# Patient Record
Sex: FEMALE | Race: WHITE | Employment: UNEMPLOYED | URBAN - METROPOLITAN AREA
[De-identification: names, ages, dates, MRNs, and addresses within clinical notes are randomized per-mention and may not be internally consistent; named-entity substitution may affect disease eponyms.]

---

## 2022-11-15 ENCOUNTER — HOSPITAL ENCOUNTER (OUTPATIENT)
Dept: GENERAL RADIOLOGY | Age: 27
Discharge: HOME | End: 2022-11-15
Payer: MEDICAID

## 2022-11-15 ENCOUNTER — HOSPITAL ENCOUNTER (OUTPATIENT)
Dept: SPEECH THERAPY | Age: 27
Setting detail: THERAPIES SERIES
Discharge: HOME | End: 2022-11-15
Payer: MEDICAID

## 2022-11-15 DIAGNOSIS — R13.10 PROBLEMS WITH SWALLOWING AND MASTICATION: ICD-10-CM

## 2022-11-15 PROCEDURE — 92611 MOTION FLUOROSCOPY/SWALLOW: CPT

## 2022-11-15 PROCEDURE — 74230 X-RAY XM SWLNG FUNCJ C+: CPT

## 2022-11-15 NOTE — PROCEDURES
Speech Language Pathology  Modified Barium Swallow Study  Facility/Department: [facility] SPEECH THERAPY        Recommendations:  Diet recommendation: IDDSI 6 Soft and Bite Sized Solids; IDDSI 2 Mildly Thick (nectar) Liquids; Meds PO as tolerated  Risk management: upright for all intake, stay upright for at least 30 mins after intake, small bites/sips, assist feed, 1:1 supervision with intake, oral care 2-3x/day to reduce adverse affects in the event of aspiration, alternate bites/sips, slow rate of intake, general GERD precautions, general aspiration precautions, and hold PO and contact SLP if s/s of aspiration or worsening respiratory status develop. NAME:Mckenzie Ibrahim  : 1995 (23 y.o.)   MRN: [id number]  ROOM: Room/bed info not found  ADMISSION DATE: (Not on file)  PATIENT DIAGNOSIS(ES): No admission diagnoses are documented for this encounter. No chief complaint on file. There are no problems to display for this patient. No past medical history on file. No past surgical history on file. Not on File    Current Diet Solid Consistency: Chopped diet  Current Diet Liquid Consistency: Thin liquids    Date of Prior Study: n/a  Type of Study: n/a  Results of Prior Study: n/a    Recent CXR/CT of Chest: n/a    Patient Complaints/Reason for Referral:  Tavo Chappell was referred for a MBS to assess the efficiency of his/her swallow function, assess for aspiration, and to make recommendations regarding safe dietary consistencies, effective compensatory strategies, and safe eating environment. Pain   Patient Currently in Pain: No    General Comments: Pt arrived for study with 2 caregivers. Pt nonverbal.  Per caregivers, pt is on a \"chopped diet' and thin liquids at baseline. They reported that pt has had intermittent coughing with meals, increased difficulty with mastication of solid PO, and has required more cues/assistance with meals.        Medical record review/interview:   Predisposing dysphagia risk factors: Hx of neurological disease  and Cognitive Deficit  Clinical signs of possible chronic dysphagia: N/A  Precipitating dysphagia risk factors: N/A      Impressions:  Treatment Dx and ICD 10: Dysphagia, unspecified (R13.10)  Radiologist: Dr. Roni Valle  Referring MD: Dr. Cary Sánchez:     Oral Preparation / Oral Phase  Impaired  Oral Phase - Major Contributing Deficits  Poor Mastication: Regular   Weak Lingual Manipulation: All  Lingual Pumping: Regular   Reduced Posterior Propulsion: Puree, Regular   Reduced Bolus Control: All  Decreased Bolus Cohesion: All  Lingual / Palatal Residue: Puree, Regular ; minimal  Oral phase comment: SLP provided intermittent cues for bolus manipulation with solid PO trials. Increased mastication time required with occasional oral holding noted with regular solid trials (despite cues). Pharyngeal Phase  Impaired  Pharyngeal Phase - Major Contributing Deficits  Delayed Swallow Initiation: All  Premature Spillage to Valleculae: All  Premature Spillage to Pyriform: Thin straw   Reduced Epiglottic Retroversion: All  Reduced Laryngeal Elevation:  All  Reduced Anterior Laryngeal Movement: All  Reduced Airway/laryngeal Closure: All  Reduced Tongue Base Retraction:  All; mildly reduced  Shallow Penetration During: Mildly (nectar) thick straw   Complete Self-clearing (shallow): Mildly (nectar) thick straw   Deep Penetration During: Thin straw   Partial Retrieval (deep): Thin straw   No Retrieval (deep): Thin straw   Aspiration After: Thin straw   No Cough Reflex: Thin straw   No Bolus Expelled: Thin straw   Pharyngeal phase comment: Reduced hyolaryngeal excursion, reduced epiglottic retroversion, and delayed swallow initiation, results in reduced airway/laryngeal closure with deep penetration during the swallow with thin liquid trials via straw and water bottle (brought by pt's caregivers). Eventual silent aspiration noted after the swallow with thin liquids.   Pt unable to successfully follow commands to complete cued cough in an effort to clear aspirated material.  *Of note, pt also quite impulsive and takes large, consecutive sips despite cues. SLP attempted to provide small amounts of PO at a time. Instances of shallow penetration observed during the swallow with mildly (nectar) thick via straw that were completely self-clearing. No aspiration. No penetration/aspiration noted with solid PO trials. No significant pharyngeal residue noted post-swallow with any PO trials. Unable to trial postural changes/swallow maneuvers (I.e. chin tuck) during study d/t pt's inability to follow commands despite cues. Although pt does not have history of respiratory illnesses and is currently on RA, recommend downgrade to mildly (nectar) thick liquids at this time. Recommend providing low volume amount of liquid at a time (d/t impulsivity) and supervision with meals to ensure adherence to compensatory swallow strategies (I.e. small bites/sips, upright positioning with PO intake). Esophageal Phase  Appears WFL when viewed at the cervical level throughout the duration of the study      Aspiration Scale   1 Material does not enter the airway    2 Material enters the airway, remains above the vocal folds, and is ejected from the airway    3 Material enters the airway, remains above the vocal folds, and is not ejected from the airway    4 Material enters the airway, contacts the vocal folds, an is ejected from the airway    5 Material enters the airway, contacts the vocal folds, and is not ejected from the airway    6 Material enters the airway, passes below the vocal folds and is ejected into the larynx or out of the airway    7 Material enters the airway, passes below the vocal folds, and is not ejected from the trachea despite effort   X 8 Material enters the airway, passes below the vocal folds, and no effort is made to eject.            Compensatory Swallowing Strategies verbalized understanding.      Prognosis for safe diet advancement: fair  Barriers to reach goals: neurological disease, cognitive deficit  Duration/Frequency of Treatment: n/a  Safety Devices in place: Yes  Type of devices: Pt left MBSS in no distress; left with caregivers      Goals: n/a; per treating SLP      Therapy Time:   Individual Concurrent Group Co-treatment   Time In 1032         Time Out 1048         Minutes 16           MBSS procedure    Signature:  Ramirez Joshi M.S. [facility]  Speech-language pathologist  [id number]

## 2023-09-03 ENCOUNTER — OFFICE VISIT (OUTPATIENT)
Dept: URGENT CARE | Age: 28
End: 2023-09-03

## 2023-09-03 VITALS
OXYGEN SATURATION: 97 % | TEMPERATURE: 98.6 F | HEART RATE: 80 BPM | WEIGHT: 130 LBS | SYSTOLIC BLOOD PRESSURE: 99 MMHG | DIASTOLIC BLOOD PRESSURE: 63 MMHG

## 2023-09-03 DIAGNOSIS — J30.2 SEASONAL ALLERGIC RHINITIS, UNSPECIFIED TRIGGER: ICD-10-CM

## 2023-09-03 DIAGNOSIS — H10.33 ACUTE BACTERIAL CONJUNCTIVITIS OF BOTH EYES: Primary | ICD-10-CM

## 2023-09-03 RX ORDER — PSYLLIUM HUSK (WITH SUGAR) 3 G/12 G
POWDER (GRAM) ORAL
COMMUNITY

## 2023-09-03 RX ORDER — TOPIRAMATE 50 MG/1
TABLET, FILM COATED ORAL
COMMUNITY
Start: 2023-08-24

## 2023-09-03 RX ORDER — IBUPROFEN 200 MG
200 TABLET ORAL EVERY 6 HOURS PRN
COMMUNITY
End: 2023-09-03 | Stop reason: ALTCHOICE

## 2023-09-03 RX ORDER — FLUOXETINE HYDROCHLORIDE 20 MG/1
20 CAPSULE ORAL DAILY
COMMUNITY
Start: 2019-06-11

## 2023-09-03 RX ORDER — MIDAZOLAM 5 MG/.1ML
SPRAY NASAL
COMMUNITY

## 2023-09-03 RX ORDER — CETIRIZINE HYDROCHLORIDE 10 MG/1
10 TABLET ORAL DAILY
Qty: 14 TABLET | Refills: 0 | Status: SHIPPED | OUTPATIENT
Start: 2023-09-03 | End: 2023-09-17

## 2023-09-03 RX ORDER — POLYMYXIN B SULFATE AND TRIMETHOPRIM 1; 10000 MG/ML; [USP'U]/ML
1 SOLUTION OPHTHALMIC EVERY 4 HOURS
Qty: 1 EACH | Refills: 0 | Status: SHIPPED | OUTPATIENT
Start: 2023-09-03 | End: 2023-09-10

## 2023-09-03 RX ORDER — CHOLECALCIFEROL (VITAMIN D3) 25 MCG
TABLET ORAL
COMMUNITY
Start: 2023-08-24

## 2023-09-03 RX ORDER — DIAZEPAM 10 MG/2ML
1 GEL RECTAL PRN
COMMUNITY
Start: 2014-11-14

## 2023-09-03 ASSESSMENT — ENCOUNTER SYMPTOMS
EYE DISCHARGE: 1
EYE ITCHING: 1
COUGH: 0
GASTROINTESTINAL NEGATIVE: 1
EYE REDNESS: 1
RHINORRHEA: 1